# Patient Record
Sex: MALE | URBAN - METROPOLITAN AREA
[De-identification: names, ages, dates, MRNs, and addresses within clinical notes are randomized per-mention and may not be internally consistent; named-entity substitution may affect disease eponyms.]

---

## 2020-04-22 ENCOUNTER — APPOINTMENT (OUTPATIENT)
Dept: URBAN - METROPOLITAN AREA CLINIC 319 | Age: 30
Setting detail: DERMATOLOGY
End: 2020-04-22

## 2020-04-22 DIAGNOSIS — L30.9 DERMATITIS, UNSPECIFIED: ICD-10-CM

## 2020-04-22 PROCEDURE — OTHER MIPS QUALITY: OTHER

## 2020-04-22 PROCEDURE — OTHER PRESCRIPTION: OTHER

## 2020-04-22 PROCEDURE — OTHER PHOTO-DOCUMENTATION: OTHER

## 2020-04-22 PROCEDURE — OTHER COUNSELING: OTHER

## 2020-04-22 PROCEDURE — OTHER ADDITIONAL NOTES: OTHER

## 2020-04-22 PROCEDURE — OTHER BIOPSY BY PUNCH METHOD: OTHER

## 2020-04-22 PROCEDURE — 11104 PUNCH BX SKIN SINGLE LESION: CPT

## 2020-04-22 RX ORDER — CLOBETASOL PROPIONATE 0.5 MG/G
OINTMENT TOPICAL
Qty: 1 | Refills: 3 | Status: ERX | COMMUNITY
Start: 2020-04-22

## 2020-04-22 ASSESSMENT — LOCATION SIMPLE DESCRIPTION DERM: LOCATION SIMPLE: LEFT PRETIBIAL REGION

## 2020-04-22 ASSESSMENT — LOCATION ZONE DERM: LOCATION ZONE: LEG

## 2020-04-22 ASSESSMENT — LOCATION DETAILED DESCRIPTION DERM: LOCATION DETAILED: LEFT DISTAL PRETIBIAL REGION

## 2020-04-22 NOTE — PROCEDURE: ADDITIONAL NOTES
Detail Level: Simple
Additional Notes: Pt reports skin lesion developed after trauma to the area, described subcutaneous nodule was present prior to trauma.  Lesion has not progressed since first appearing.  Few other additional subcutaneous nodules felt on palpation to right leg, none appreciated to upper extremities.  No constitutional symptoms or lymphadenopathy appreciated.

## 2020-04-22 NOTE — PROCEDURE: BIOPSY BY PUNCH METHOD
Size Of Lesion In Cm (Optional): 0
Consent: Written consent was obtained and risks were reviewed including but not limited to scarring, infection, bleeding, scabbing, incomplete removal, nerve damage and allergy to anesthesia.
Validate Anticipated Plan: No
Epidermal Sutures: 4-0 Biosyn
Validate Note Data (See Information Below): Yes
Anesthesia Volume In Cc (Will Not Render If 0): 1
Information: Selecting Yes will display possible errors in your note based on the variables you have selected. This validation is only offered as a suggestion for you. PLEASE NOTE THAT THE VALIDATION TEXT WILL BE REMOVED WHEN YOU FINALIZE YOUR NOTE. IF YOU WANT TO FAX A PRELIMINARY NOTE YOU WILL NEED TO TOGGLE THIS TO 'NO' IF YOU DO NOT WANT IT IN YOUR FAXED NOTE.
Notification Instructions: Patient will be notified of biopsy results. However, patient instructed to call the office if not contacted within 2 weeks.
Anesthesia Type: 1% lidocaine without epinephrine
Billing Type: Third-Party Bill
Home Suture Removal Text: Patient was provided a home suture removal kit and will remove their sutures at home.  If they have any questions or difficulties they will call the office.
Hemostasis: None
Punch Size In Mm: 4
Detail Level: Detailed
Biopsy Type: H and E
Dressing: Band-Aid
Wound Care: Mupirocin
Post-Care Instructions: I reviewed with the patient in detail post-care instructions. Patient is to keep the biopsy site dry overnight, and then apply bacitracin twice daily until healed. Patient may apply hydrogen peroxide soaks to remove any crusting.

## 2020-04-22 NOTE — HPI: SKIN LESION
What Type Of Note Output Would You Prefer (Optional)?: Standard Output
How Severe Is Your Skin Lesion?: mild
Has Your Skin Lesion Been Treated?: not been treated
Is This A New Presentation, Or A Follow-Up?: Skin Lesion Referral
Additional History: Patient reports skin lesion started out as a small round knot. Patient was in a [place] accident during the accident somehow leg was rubbed again [place] and felt like knot popped inside and that’s when dryness, redness began to appear. Lesion began to spread on leg. Patient states Dr. Barton treated him for cellulitis and was given triamcinolone ointment which only made the redness decrease. Previous Dr Treated him with antibiotics which he does not recall which ones. Above afffected area patient reports he has another knot which is asymptomatic.
Who Is Your Referring Provider?: Dr.Robert Barton